# Patient Record
Sex: MALE | Race: WHITE
[De-identification: names, ages, dates, MRNs, and addresses within clinical notes are randomized per-mention and may not be internally consistent; named-entity substitution may affect disease eponyms.]

---

## 2019-07-29 ENCOUNTER — HOSPITAL ENCOUNTER (EMERGENCY)
Dept: HOSPITAL 76 - ED | Age: 39
Discharge: HOME | End: 2019-07-29
Payer: COMMERCIAL

## 2019-07-29 VITALS — SYSTOLIC BLOOD PRESSURE: 148 MMHG | DIASTOLIC BLOOD PRESSURE: 86 MMHG
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
## 2019-07-29 DIAGNOSIS — S51.812A: Primary | ICD-10-CM

## 2019-07-29 PROCEDURE — 99282 EMERGENCY DEPT VISIT SF MDM: CPT

## 2019-07-29 PROCEDURE — 1040M: CPT

## 2019-07-29 PROCEDURE — 12002 RPR S/N/AX/GEN/TRNK2.6-7.5CM: CPT

## 2019-07-29 NOTE — ED PHYSICIAN DOCUMENTATION
PD HPI UPPER EXT INJURY





- Stated complaint


Stated Complaint: L HAND LAC





- Chief complaint


Chief Complaint: Laceration





- History obtained from


History obtained from: Patient





- History of Present Illness


Location: Left, Forearm (volar aspect)


Type of injury: Laceration (forearm lac from edge of metal. No FB.)


Where injury occurred: Work


Timing - onset: How many hours ago, Today


Timing - details: Abrupt onset


Associated symptoms: No: Weakness, Numbness, Swelling


Contributing factors: Work related.  No: Anticoagulated


Similar symptoms before: Has not had sx before





Review of Systems


Neurologic: denies: Focal weakness, Numbness, Near syncope





PD PAST MEDICAL HISTORY





- Past Medical History


Past Medical History: No





- Present Medications


Home Medications: 


                                Ambulatory Orders











 Medication  Instructions  Recorded  Confirmed


 


No Known Home Medications  07/29/19 07/29/19














- Allergies


Allergies/Adverse Reactions: 


                                    Allergies











Allergy/AdvReac Type Severity Reaction Status Date / Time


 


No Known Drug Allergies Allergy   Verified 07/29/19 15:45














PD ED PE NORMAL





- Vitals


Vital signs reviewed: Yes





- General


General: Alert and oriented X 3, No acute distress, Well developed/nourished





- Derm


Derm: Normal color, Warm and dry





- Extremities


Extremities: Other (left forearm with volar laceration to fatty tissue without 

FB, is 3 cm length, without FB. No weakness nor pain with flexion.)





- Neuro


Neuro: No motor deficit, No sensory deficit





Results





- Vitals


Vitals: 


                               Vital Signs - 24 hr











  07/29/19





  15:41


 


Temperature 37.1 C


 


Heart Rate 96


 


Respiratory 18





Rate 


 


Blood Pressure 148/86 H


 


O2 Saturation 96








                                     Oxygen











O2 Source                      Room air

















Procedures





- Laceration (location)


  ** left volar forearm


Length in cm: 3.4


Wound type: Linear, Clean.  No: Contaminated


Neurovascular status: Sensory intact, Motor intact, Vascular intact


Tendon involvement: Tendon intact


Anesthesia: Lidocaine 1% with epi


Wound Preparation: Irrigated copiously NS


Skin layer closure: Nylon, Staples, Size #-0 - enter number (4), Sutures - enter

#


Other: Patient tolerated well, No complications, Neurovascular intact, Dressing 

applied, Tetanus UTD


Complexity: Simple





Departure





- Departure


Disposition: 01 Home, Self Care


Clinical Impression: 


Forearm laceration


Qualifiers:


 Encounter type: initial encounter Laterality: left Qualified Code(s): S51.812A 

- Laceration without foreign body of left forearm, initial encounter





Condition: Stable


Record reviewed to determine appropriate education?: Yes


Instructions:  ED Laceration All


Print Language: [place]


Comments: 


It is okay to wash and shower. Clean off the wound twice a day with soap and 

water, or peroxide and water. Apply some antibiotic ointment to it to keep it 

moist. Also to watch for signs of infection such as purulence, redness or 

increasing pain. Return to your primary care or the ER at the specified time for

suture removal.





Suture removal 8 to 10 days.





Tylenol ibuprofen if needed for pains.


Discharge Date/Time: 07/29/19 16:31